# Patient Record
(demographics unavailable — no encounter records)

---

## 2024-10-16 NOTE — HISTORY OF PRESENT ILLNESS
[FreeTextEntry1] : 62F with a history of HTN, HLD, and arthritis who was referred to us by Dr. Rodney Gray for evaluation of bilateral carotid stenosis seen on Duplex and recent CTA. Patient describes being sent by her cardiologist Dr. Toni Gray for a CTA Head and Neck after she was found to have bilateral ICA stenosis on routine duplex screening which demonstrated <50% stenosis on the R and ~50-70% on the L in June of this year. Her CTA reaffirmed these findings and she was then referred to a vascular surgeon, Dr. Sloan, for evaluation where she was started on Plavix, already was on aspirin, and was told she may need a stent. She went back to her primary, Dr. Stevens who asked for her to be evaluated by us. She denies any history of significant neurovascular symptoms but says that her mother suffered an embolic stroke and passed away in August of this year. She denies any smoking history but drinks socially. She works at an eye clinic and remains very active.

## 2024-10-16 NOTE — ADDENDUM
[FreeTextEntry1] : This note was written by Marvel Funes, acting as a scribe for Dr. Lexi Espinal.  I, Dr. Lexi Espinal, have read and attest that all the information, medical decision-making, and discharge instructions within are true and accurate.  I, Dr. Lexi Espinal, personally performed the evaluation and management (E/M) services for this new patient.  That E/M includes conducting the initial examination, assessing all conditions, and establishing the plan of care.  Today, my ACP, Marvel Funes, was here to observe my evaluation and management services for this patient to be followed going forward.

## 2024-10-16 NOTE — PHYSICAL EXAM
[Normal Breath Sounds] : Normal breath sounds [Normal Heart Sounds] : normal heart sounds [2+] : left 2+ [No Rash or Lesion] : No rash or lesion [Alert] : alert [Oriented to Person] : oriented to person [Oriented to Place] : oriented to place [Oriented to Time] : oriented to time [Calm] : calm [Carotid Bruits] : no carotid bruits [Ankle Swelling (On Exam)] : not present [Abdomen Masses] : No abdominal masses [Abdomen Tenderness] : ~T ~M No abdominal tenderness [de-identified] : NAD [de-identified] : NC/AT [de-identified] : Supple [de-identified] : FROM

## 2024-10-16 NOTE — PHYSICAL EXAM
[Normal Breath Sounds] : Normal breath sounds [Normal Heart Sounds] : normal heart sounds [2+] : left 2+ [No Rash or Lesion] : No rash or lesion [Alert] : alert [Oriented to Person] : oriented to person [Oriented to Place] : oriented to place [Oriented to Time] : oriented to time [Calm] : calm [Carotid Bruits] : no carotid bruits [Ankle Swelling (On Exam)] : not present [Abdomen Masses] : No abdominal masses [Abdomen Tenderness] : ~T ~M No abdominal tenderness [de-identified] : NAD [de-identified] : NC/AT [de-identified] : Supple [de-identified] : FROM

## 2024-10-16 NOTE — ASSESSMENT
[Arterial/Venous Disease] : arterial/venous disease [FreeTextEntry1] : 62F with a history of HTN, HLD, and arthritis who was referred to us by Dr. Rodney Gray for evaluation of bilateral carotid stenosis seen on Duplex and recent CTA.   Repeat Duplex in our office demonstrated <50% stenosis with fibrocalcific plaques bilaterally and velocities of 149/52 on the L and 81/30 on the R.   Discussed with patient that there is no indication for acute vascular intervention given these findings at this time and she may return to clinic in 6 months or sooner if she becomes symptomatic.

## 2024-10-16 NOTE — ADDENDUM
[FreeTextEntry1] : This note was written by Marvel uFnes, acting as a scribe for Dr. Lexi Espinal.  I, Dr. Lexi Espinal, have read and attest that all the information, medical decision-making, and discharge instructions within are true and accurate.  I, Dr. Lexi Espinal, personally performed the evaluation and management (E/M) services for this new patient.  That E/M includes conducting the initial examination, assessing all conditions, and establishing the plan of care.  Today, my ACP, Marvel Funes, was here to observe my evaluation and management services for this patient to be followed going forward.

## 2025-04-15 NOTE — ADDENDUM
[FreeTextEntry1] : This note was written by Maia MELENDEZ, acting as a scribe for Dr. Lexi Espinal.  I, Dr. Lexi Espinal, have read and attest that all the information, medical decision-making, and discharge instructions within are true and accurate.  I, Dr. Lexi Espinal, personally performed the evaluation and management (E/M) services for this established patient who presents today with (an) existing condition(s).  That E/M includes conducting the examination, assessing all conditions, and (re)establishing/reinforcing a plan of care.  Today, my ACP, Maia MELENDEZ, was here to observe my evaluation and management services for this condition to be followed going forward.

## 2025-04-15 NOTE — PROCEDURE
[FreeTextEntry1] : Carotid Duplex in our office demonstrated <50% stenosis with fibrocalcific plaques bilaterally.

## 2025-04-15 NOTE — PHYSICAL EXAM
[Normal Breath Sounds] : Normal breath sounds [Normal Heart Sounds] : normal heart sounds [2+] : left 2+ [No Rash or Lesion] : No rash or lesion [Alert] : alert [Oriented to Person] : oriented to person [Oriented to Place] : oriented to place [Oriented to Time] : oriented to time [Calm] : calm [Carotid Bruits] : no carotid bruits [Ankle Swelling (On Exam)] : not present [Abdomen Masses] : No abdominal masses [Abdomen Tenderness] : ~T ~M No abdominal tenderness [de-identified] : NAD [de-identified] : NC/AT [de-identified] : Supple [de-identified] : FROM [de-identified] : grossly intact

## 2025-04-15 NOTE — PHYSICAL EXAM
[Normal Breath Sounds] : Normal breath sounds [Normal Heart Sounds] : normal heart sounds [2+] : left 2+ [No Rash or Lesion] : No rash or lesion [Alert] : alert [Oriented to Person] : oriented to person [Oriented to Place] : oriented to place [Oriented to Time] : oriented to time [Calm] : calm [Carotid Bruits] : no carotid bruits [Ankle Swelling (On Exam)] : not present [Abdomen Masses] : No abdominal masses [Abdomen Tenderness] : ~T ~M No abdominal tenderness [de-identified] : NAD [de-identified] : NC/AT [de-identified] : Supple [de-identified] : FROM [de-identified] : grossly intact

## 2025-04-15 NOTE — HISTORY OF PRESENT ILLNESS
[FreeTextEntry1] : 63yoF with a history of HTN, HLD, and arthritis who returns for a f/u of bilateral carotid stenosis. She denies any history of significant neurovascular symptoms but says that her mother suffered an embolic stroke and passed away last year. She denies any smoking history but drinks socially. She works at an eye clinic and remains very active.

## 2025-04-15 NOTE — ASSESSMENT
[Arterial/Venous Disease] : arterial/venous disease [FreeTextEntry1] : 63yoF with a history of HTN, HLD, and arthritis who returns for a f/u of bilateral carotid stenosis. Repeat Duplex in our office demonstrated <50% stenosis with fibrocalcific plaques bilaterally  Discussed with patient that there is no indication for acute vascular intervention given these findings at this time and she may return to clinic in 1 year or sooner if she becomes symptomatic.